# Patient Record
Sex: FEMALE | Race: BLACK OR AFRICAN AMERICAN
[De-identification: names, ages, dates, MRNs, and addresses within clinical notes are randomized per-mention and may not be internally consistent; named-entity substitution may affect disease eponyms.]

---

## 2017-03-01 ENCOUNTER — RX ONLY (OUTPATIENT)
Age: 47
Setting detail: RX ONLY
End: 2017-03-01

## 2017-04-01 ENCOUNTER — HOSPITAL ENCOUNTER (EMERGENCY)
Dept: HOSPITAL 5 - ED | Age: 47
LOS: 1 days | Discharge: HOME | End: 2017-04-02
Payer: COMMERCIAL

## 2017-04-01 DIAGNOSIS — R10.31: ICD-10-CM

## 2017-04-01 DIAGNOSIS — N83.8: Primary | ICD-10-CM

## 2017-04-01 LAB
ANION GAP SERPL CALC-SCNC: 16 MMOL/L
BASOPHILS NFR BLD AUTO: 0.6 % (ref 0–1.8)
BILIRUB UR QL STRIP: (no result)
BLOOD UR QL VISUAL: (no result)
BUN SERPL-MCNC: 15 MG/DL (ref 7–17)
BUN/CREAT SERPL: 25 %
CALCIUM SERPL-MCNC: 9.4 MG/DL (ref 8.4–10.2)
CHLORIDE SERPL-SCNC: 98.5 MMOL/L (ref 98–107)
CO2 SERPL-SCNC: 25 MMOL/L (ref 22–30)
EOSINOPHIL NFR BLD AUTO: 2.6 % (ref 0–4.3)
GLUCOSE SERPL-MCNC: 109 MG/DL (ref 65–100)
HCT VFR BLD CALC: 38 % (ref 30.3–42.9)
HGB BLD-MCNC: 12.1 GM/DL (ref 10.1–14.3)
KETONES UR STRIP-MCNC: (no result) MG/DL
LEUKOCYTE ESTERASE UR QL STRIP: (no result)
MCH RBC QN AUTO: 28 PG (ref 28–32)
MCHC RBC AUTO-ENTMCNC: 32 % (ref 30–34)
MCV RBC AUTO: 89 FL (ref 79–97)
NITRITE UR QL STRIP: (no result)
PH UR STRIP: 5 [PH] (ref 5–7)
PLATELET # BLD: 430 K/MM3 (ref 140–440)
POTASSIUM SERPL-SCNC: 3.7 MMOL/L (ref 3.6–5)
PROT UR STRIP-MCNC: (no result) MG/DL
RBC # BLD AUTO: 4.29 M/MM3 (ref 3.65–5.03)
RBC #/AREA URNS HPF: 1 /HPF (ref 0–6)
SODIUM SERPL-SCNC: 136 MMOL/L (ref 137–145)
UROBILINOGEN UR-MCNC: < 2 MG/DL (ref ?–2)
WBC # BLD AUTO: 8.3 K/MM3 (ref 4.5–11)
WBC #/AREA URNS HPF: < 1 /HPF (ref 0–6)

## 2017-04-01 PROCEDURE — 81001 URINALYSIS AUTO W/SCOPE: CPT

## 2017-04-01 PROCEDURE — 36415 COLL VENOUS BLD VENIPUNCTURE: CPT

## 2017-04-01 PROCEDURE — 96374 THER/PROPH/DIAG INJ IV PUSH: CPT

## 2017-04-01 PROCEDURE — 74177 CT ABD & PELVIS W/CONTRAST: CPT

## 2017-04-01 PROCEDURE — 76856 US EXAM PELVIC COMPLETE: CPT

## 2017-04-01 PROCEDURE — 76830 TRANSVAGINAL US NON-OB: CPT

## 2017-04-01 PROCEDURE — 80048 BASIC METABOLIC PNL TOTAL CA: CPT

## 2017-04-01 PROCEDURE — 81025 URINE PREGNANCY TEST: CPT

## 2017-04-01 PROCEDURE — 99284 EMERGENCY DEPT VISIT MOD MDM: CPT

## 2017-04-01 PROCEDURE — 85025 COMPLETE CBC W/AUTO DIFF WBC: CPT

## 2017-04-01 NOTE — ULTRASOUND REPORT
FINAL REPORT



EXAM:  US PELVIC COMPLETE



HISTORY:  abdominal pain 



TECHNIQUE:  Transvesical and endovaginal pelvic sonographic

imaging



FINDINGS:  

The uterus is normally anteverted. It measures 8.8 x 4.0 x 5.8

centimeters.



Endometrial stripe measures 2.6 millimeters which is within

normal limits.



Right ovary is not identified. By clinical history it is

surgically absent.



Left ovary measures 4.3 x 4.2 x 3.6 centimeters with 2 cysts, 1

measuring 3 centimeters and 1 measuring 1.6 centimeters. The

cysts appear simple.



There is no free pelvic fluid or adnexal mass lesion.



IMPRESSION:  

Surgically absent right ovary.



Two left ovarian/adnexal cysts, measuring 3 centimeters and 1.6

centimeters. These cysts appear simple. If clinically indicated,

recommend off cycle follow-up imaging in 6 or 10 weeks.



No adnexal mass or free cul-de-sac fluid.



Normal endometrial stripe thickness.

## 2017-04-02 VITALS — SYSTOLIC BLOOD PRESSURE: 139 MMHG | DIASTOLIC BLOOD PRESSURE: 98 MMHG

## 2017-04-02 NOTE — EMERGENCY DEPARTMENT REPORT
HPI





- General


Chief Complaint: Abdominal Pain


Time Seen by Provider: 04/02/17 07:25





- HPI


HPI: 





Room 10





The patient is a 46-year-old female presenting with a chief complaint of 

abdominal pain.  The patient states for 1 week she has had constant pain in the 

right lower quadrant  all occasional sharp component.  Patient states 

her pain is constant in nature.  The patient states 2 days ago shortness or 

primary physician for this pain where she underwent a pelvic exam/Pap smear, 

had blood work drawn and an ultrasound performed.  The patient states she was 

told she had an ovarian cyst on the right and there was concern for pelvic 

inflammatory disease.  Patient was started on doxycycline and Percocet.  The 

patient states she has been taking the antibiotic as prescribed but only taken 

a small amount of the Percocet because she is afraid of becoming addicted.  The 

patient states her pain has increased since yesterday prompting her to come to 

the emergency department.  Patient admits to occasional nausea but denies 

vomiting, diarrhea or fever.  Patient currently gives her pain a score of 8/10





Location: Right lower quadrant


Duration: One week


Quality: Dull and sharp


Severity:8/10


Modifying factors: [see above]


Context: [see above]


Mode of transportation: The patient drove herself to the emergency department 

and there are no visitors present





ED Past Medical Hx





- Past Medical History


Previous Medical History?: No





- Surgical History


Hx Appendectomy: Yes





- Family History


Family history: no significant





- Social History


Smoking Status: Never Smoker


Substance Use Type: None (denies illicit drug use), Alcohol (occasional)





- Medications


Home Medications: 


 Home Medications











 Medication  Instructions  Recorded  Confirmed  Last Taken  Type


 


Doxycycline 100 mg PO BID 04/01/17 04/01/17 Unknown History


 


Hydrochlorothiazide [HCTZ] 1 tab PO DAILY 04/01/17 04/01/17 Unknown History


 


Metoprolol 50 mg PO DAILY 04/01/17 04/01/17 Unknown History


 


Oxycodone HCl/Acetaminophen 1 tab PO Q8H 04/01/17 04/01/17 Unknown History





[Percocet 7.5/325 mg]     














ED Review of Systems


ROS: 


Stated complaint: ABD PAIN/EMESIS


Other details as noted in HPI





Comment: All other systems reviewed and negative


Constitutional: denies: chills, fever


Eyes: denies: eye pain, eye discharge, vision change


ENT: denies: ear pain, throat pain


Respiratory: denies: cough, shortness of breath, wheezing


Cardiovascular: denies: chest pain, palpitations


Endocrine: no symptoms reported


Gastrointestinal: nausea.  denies: vomiting


Genitourinary: denies: urgency, dysuria, discharge


Musculoskeletal: denies: back pain, joint swelling, arthralgia


Skin: denies: rash, lesions


Neurological: denies: headache, weakness, paresthesias


Psychiatric: denies: anxiety, depression


Hematological/Lymphatic: denies: easy bleeding, easy bruising





Physical Exam





- Physical Exam


Vital Signs: 


 Vital Signs











  04/01/17 04/02/17 04/02/17





  20:30 07:14 07:15


 


Temperature 97.8 F 98 F 


 


Pulse Rate 85 66 


 


Respiratory 18 18 18





Rate   


 


Blood Pressure 171/115  


 


Blood Pressure 171/115 139/93 





[Left]   


 


O2 Sat by Pulse 97 100 100





Oximetry   











Physical Exam: 





GENERAL: The patient is well-developed well-nourished female lying on stretcher 

not appearing to be in acute distress. []


HEENT: Normocephalic.  Atraumatic.  Extraocular motions are intact.  Patient 

has moist mucous membranes.


NECK: Supple.  Trachea midline


CHEST/LUNGS: Clear to auscultation.  There is no respiratory distress noted.


HEART/CARDIOVASCULAR: Regular.  There is no tachycardia.  There is no gallop 

rub or murmur.


ABDOMEN: Abdomen is soft, with mild discomfort to palpation in the right lower 

quadrant.  Patient has normal bowel sounds.  There is no abdominal distention.


SKIN: There is no rash.  There is no edema.  There is no diaphoresis.


NEURO: The patient is awake, alert, and oriented.  The patient is cooperative.  

The patient has normal speech 


MUSCULOSKELETAL:  There is no evidence of acute injury.





ED Course


 Vital Signs











  04/01/17 04/02/17 04/02/17





  20:30 07:14 07:15


 


Temperature 97.8 F 98 F 


 


Pulse Rate 85 66 


 


Respiratory 18 18 18





Rate   


 


Blood Pressure 171/115  


 


Blood Pressure 171/115 139/93 





[Left]   


 


O2 Sat by Pulse 97 100 100





Oximetry   














ED Medical Decision Making





- Lab Data


Result diagrams: 


 04/01/17 20:42





 04/01/17 20:42





 Laboratory Tests











  04/01/17 04/01/17 04/01/17





  20:42 20:42 21:13


 


WBC  8.3  


 


RBC  4.29  


 


Hgb  12.1  


 


Hct  38.0  


 


MCV  89  


 


MCH  28  


 


MCHC  32  


 


RDW  14.0  


 


Plt Count  430  


 


Lymph % (Auto)  28.1  


 


Mono % (Auto)  7.8 H  


 


Eos % (Auto)  2.6  


 


Baso % (Auto)  0.6  


 


Lymph #  2.3  


 


Mono #  0.6  


 


Eos #  0.2  


 


Baso #  0.1  


 


Seg Neutrophils %  60.9  


 


Seg Neutrophils #  5.0  


 


Sodium   136 L 


 


Potassium   3.7 


 


Chloride   98.5 


 


Carbon Dioxide   25 


 


Anion Gap   16 


 


BUN   15 


 


Creatinine   0.6 L 


 


Estimated GFR   > 60 


 


BUN/Creatinine Ratio   25.00 


 


Glucose   109 H 


 


Calcium   9.4 


 


Urine Color    Straw


 


Urine Turbidity    Clear


 


Urine pH    5.0


 


Ur Specific Gravity    1.008


 


Urine Protein    <15 mg/dl


 


Urine Glucose (UA)    Neg


 


Urine Ketones    Neg


 


Urine Blood    Neg


 


Urine Nitrite    Neg


 


Ur Reducing Substances    Not Reportable


 


Urine Bilirubin    Neg


 


Urine Ictotest    Not Reportable


 


Urine Urobilinogen    < 2.0


 


Ur Leukocyte Esterase    Neg


 


Urine WBC (Auto)    < 1.0


 


Urine RBC (Auto)    1.0


 


U Epithel Cells (Auto)    2.0


 


Urine HCG, Qual    Negative

















- Radiology Data


Radiology results: report reviewed (CT abdomen and pelvis), image reviewed (CT 

abdomen and pelvis)


CT abdomen and pelvis (read by radiologist)-there is a nonspecific left adnexal 

cystic mass measuring about 2.8 x 2.8 x 3.4 cm.  There is some hyperdensity or 

enhancement in the endometrial cavity.  I cannot exclude an endometrial mass or 

polyp.  Small umbilical hernia which contains fat only.








- Differential Diagnosis


ovarian cysts, diverticulitis, cholelithiasis


Critical care attestation.: 


If time is entered above; I have spent that time in minutes in the direct care 

of this critically ill patient, excluding procedure time.








ED Disposition


Clinical Impression: 


 Abdominal pain, Adnexal cyst





Disposition: DISCHARGED TO HOME OR SELFCARE


Is pt being admited?: No


Does the pt Need Aspirin: No


Condition: Stable


Instructions:  Abdominal Pain (ED)


Additional Instructions: 


Return to the emergency department immediately should you develop worsening 

symptoms, fever, inability to tolerate food or liquid or any other concerns.


Referrals: 


[facility] [Primary Care Provider] - 3-5 Days


[facility] [Provider Group] - 3-5 Days


Time of Disposition: 09:17

## 2017-04-02 NOTE — CAT SCAN REPORT
FINAL REPORT



EXAM:  CT ABDOMEN PELVIS W CON



HISTORY:  right lower quadrant abdominal pain. 



TECHNIQUE:  CT abdomen and pelvis performed.  Images extend from

diaphragm to pubic symphysis.  



PRIORS:  None.



FINDINGS:  

The visualized aspects of the lung bases are clear.

The visualized liver, spleen, pancreas, adrenal glands and

kidneys demonstrate no significant abnormalities.

There is no abdominal aortic aneurysm.

There is no evidence of intestinal obstruction.

The appendix is normal.

There is no free intraperitoneal air.

There is a small umbilical hernia which contains only fat.



The bladder is unremarkable.

There is a left adnexal cystic mass 3.8 x  2.8 x 3.4 cm. There is

some hyperdense or enhancing material within the endometrial

cavity. I cannot exclude an endometrial mass or polyp. 



IMPRESSION:  

There is a nonspecific left adnexal cystic mass measuring about

3.8 x 2.8 x 3.4 cm.



There is some hyperdensity or enhancement in the endometrial

cavity. I cannot exclude an endometrial mass or polyp.



Small umbilical hernia which contains only fat.

## 2023-07-05 ENCOUNTER — P2P PATIENT RECORD (OUTPATIENT)
Age: 53
End: 2023-07-05

## 2023-07-26 ENCOUNTER — OFFICE VISIT (OUTPATIENT)
Dept: URBAN - METROPOLITAN AREA SURGERY CENTER 23 | Facility: SURGERY CENTER | Age: 53
End: 2023-07-26

## 2023-09-14 ENCOUNTER — OFFICE VISIT (OUTPATIENT)
Dept: URBAN - METROPOLITAN AREA CLINIC 118 | Facility: CLINIC | Age: 53
End: 2023-09-14

## 2023-11-08 ENCOUNTER — OFFICE VISIT (OUTPATIENT)
Dept: URBAN - METROPOLITAN AREA CLINIC 118 | Facility: CLINIC | Age: 53
End: 2023-11-08

## 2023-11-15 ENCOUNTER — OFFICE VISIT (OUTPATIENT)
Dept: URBAN - METROPOLITAN AREA CLINIC 118 | Facility: CLINIC | Age: 53
End: 2023-11-15

## 2023-11-15 RX ORDER — ACETAMINOPHEN AND CODEINE PHOSPHATE 300; 30 MG/1; MG/1
TABLET ORAL
Qty: 0 | Refills: 0 | Status: ACTIVE | COMMUNITY
Start: 2017-06-19 | End: 1900-01-01

## 2023-11-15 RX ORDER — HYDROCHLOROTHIAZIDE 50 MG/1
TABLET ORAL
Qty: 0 | Refills: 0 | Status: ACTIVE | COMMUNITY
Start: 2017-06-18 | End: 1900-01-01

## 2023-11-15 RX ORDER — AMOXICILLIN 250 MG/1
CAPSULE ORAL
Qty: 0 | Refills: 0 | Status: ACTIVE | COMMUNITY
Start: 2017-07-06 | End: 1900-01-01

## 2023-11-15 RX ORDER — DICYCLOMINE HYDROCHLORIDE 20 MG/1
TABLET ORAL
Qty: 0 | Refills: 0 | Status: ACTIVE | COMMUNITY
Start: 2017-05-24 | End: 1900-01-01

## 2023-11-15 RX ORDER — METOPROLOL TARTRATE 50 MG/1
TABLET, FILM COATED ORAL
Qty: 0 | Refills: 0 | Status: ACTIVE | COMMUNITY
Start: 2017-06-25 | End: 1900-01-01

## 2023-11-15 RX ORDER — UBIDECARENONE 30 MG
CAPSULE ORAL
Qty: 0 | Refills: 0 | Status: ACTIVE | COMMUNITY
Start: 1900-01-01 | End: 1900-01-01

## 2023-11-15 RX ORDER — ALPRAZOLAM 0.5 MG/1
TABLET ORAL
Qty: 0 | Refills: 0 | Status: ACTIVE | COMMUNITY
Start: 2017-07-06 | End: 1900-01-01

## 2024-09-10 ENCOUNTER — OFFICE VISIT (OUTPATIENT)
Dept: URBAN - METROPOLITAN AREA CLINIC 88 | Facility: CLINIC | Age: 54
End: 2024-09-10

## 2024-09-10 RX ORDER — ACETAMINOPHEN AND CODEINE PHOSPHATE 300; 30 MG/1; MG/1
TABLET ORAL
Qty: 0 | Refills: 0 | Status: ACTIVE | COMMUNITY
Start: 2017-06-19

## 2024-09-10 RX ORDER — UBIDECARENONE 30 MG
CAPSULE ORAL
Qty: 0 | Refills: 0 | Status: ACTIVE | COMMUNITY
Start: 1900-01-01

## 2024-09-10 RX ORDER — ALPRAZOLAM 0.5 MG/1
TABLET ORAL
Qty: 0 | Refills: 0 | Status: ACTIVE | COMMUNITY
Start: 2017-07-06

## 2024-09-10 RX ORDER — HYDROCHLOROTHIAZIDE 50 MG/1
TABLET ORAL
Qty: 0 | Refills: 0 | Status: ACTIVE | COMMUNITY
Start: 2017-06-18

## 2024-09-10 RX ORDER — AMOXICILLIN 250 MG/1
CAPSULE ORAL
Qty: 0 | Refills: 0 | Status: ACTIVE | COMMUNITY
Start: 2017-07-06

## 2024-09-10 RX ORDER — METOPROLOL TARTRATE 50 MG/1
TABLET, FILM COATED ORAL
Qty: 0 | Refills: 0 | Status: ACTIVE | COMMUNITY
Start: 2017-06-25

## 2024-09-10 RX ORDER — DICYCLOMINE HYDROCHLORIDE 20 MG/1
TABLET ORAL
Qty: 0 | Refills: 0 | Status: ACTIVE | COMMUNITY
Start: 2017-05-24

## 2024-09-10 NOTE — HPI-TODAY'S VISIT:
54 referred by Saurav Howard NP to schedule screening colonoscopy. Her last colonoscopy was in 2017 by Dr. Joey Freeman with normal colon and TI.  Repeat colonoscopy was recommended in 10 years.  Denies family history of colon cancer.  Patient has not had signs of rectal bleeding, changes in bowel habits, constipation, or diarrhea.

## 2024-09-17 ENCOUNTER — DASHBOARD ENCOUNTERS (OUTPATIENT)
Age: 54
End: 2024-09-17

## 2024-09-24 ENCOUNTER — OFFICE VISIT (OUTPATIENT)
Dept: URBAN - METROPOLITAN AREA CLINIC 88 | Facility: CLINIC | Age: 54
End: 2024-09-24

## 2024-09-24 RX ORDER — AMOXICILLIN 250 MG/1
CAPSULE ORAL
Qty: 0 | Refills: 0 | Status: ACTIVE | COMMUNITY
Start: 2017-07-06

## 2024-09-24 RX ORDER — DICYCLOMINE HYDROCHLORIDE 20 MG/1
TABLET ORAL
Qty: 0 | Refills: 0 | Status: ACTIVE | COMMUNITY
Start: 2017-05-24

## 2024-09-24 RX ORDER — ACETAMINOPHEN AND CODEINE PHOSPHATE 300; 30 MG/1; MG/1
TABLET ORAL
Qty: 0 | Refills: 0 | Status: ACTIVE | COMMUNITY
Start: 2017-06-19

## 2024-09-24 RX ORDER — HYDROCHLOROTHIAZIDE 50 MG/1
TABLET ORAL
Qty: 0 | Refills: 0 | Status: ACTIVE | COMMUNITY
Start: 2017-06-18

## 2024-09-24 RX ORDER — UBIDECARENONE 30 MG
CAPSULE ORAL
Qty: 0 | Refills: 0 | Status: ACTIVE | COMMUNITY
Start: 1900-01-01

## 2024-09-24 RX ORDER — METOPROLOL TARTRATE 50 MG/1
TABLET, FILM COATED ORAL
Qty: 0 | Refills: 0 | Status: ACTIVE | COMMUNITY
Start: 2017-06-25

## 2024-09-24 RX ORDER — ALPRAZOLAM 0.5 MG/1
TABLET ORAL
Qty: 0 | Refills: 0 | Status: ACTIVE | COMMUNITY
Start: 2017-07-06

## 2024-10-04 ENCOUNTER — OFFICE VISIT (OUTPATIENT)
Dept: URBAN - METROPOLITAN AREA CLINIC 88 | Facility: CLINIC | Age: 54
End: 2024-10-04

## 2024-10-15 ENCOUNTER — OFFICE VISIT (OUTPATIENT)
Dept: URBAN - METROPOLITAN AREA CLINIC 88 | Facility: CLINIC | Age: 54
End: 2024-10-15

## 2024-10-22 ENCOUNTER — OFFICE VISIT (OUTPATIENT)
Dept: URBAN - METROPOLITAN AREA CLINIC 88 | Facility: CLINIC | Age: 54
End: 2024-10-22

## 2024-10-30 ENCOUNTER — OFFICE VISIT (OUTPATIENT)
Dept: URBAN - METROPOLITAN AREA CLINIC 88 | Facility: CLINIC | Age: 54
End: 2024-10-30

## 2024-10-30 RX ORDER — ALPRAZOLAM 0.5 MG/1
TABLET ORAL
Qty: 0 | Refills: 0 | Status: ACTIVE | COMMUNITY
Start: 2017-07-06

## 2024-10-30 RX ORDER — ACETAMINOPHEN AND CODEINE PHOSPHATE 300; 30 MG/1; MG/1
TABLET ORAL
Qty: 0 | Refills: 0 | Status: ACTIVE | COMMUNITY
Start: 2017-06-19

## 2024-10-30 RX ORDER — METOPROLOL TARTRATE 50 MG/1
TABLET, FILM COATED ORAL
Qty: 0 | Refills: 0 | Status: ACTIVE | COMMUNITY
Start: 2017-06-25

## 2024-10-30 RX ORDER — AMOXICILLIN 250 MG/1
CAPSULE ORAL
Qty: 0 | Refills: 0 | Status: ACTIVE | COMMUNITY
Start: 2017-07-06

## 2024-10-30 RX ORDER — HYDROCHLOROTHIAZIDE 50 MG/1
TABLET ORAL
Qty: 0 | Refills: 0 | Status: ACTIVE | COMMUNITY
Start: 2017-06-18

## 2024-10-30 RX ORDER — DICYCLOMINE HYDROCHLORIDE 20 MG/1
TABLET ORAL
Qty: 0 | Refills: 0 | Status: ACTIVE | COMMUNITY
Start: 2017-05-24

## 2024-10-30 RX ORDER — UBIDECARENONE 30 MG
CAPSULE ORAL
Qty: 0 | Refills: 0 | Status: ACTIVE | COMMUNITY
Start: 1900-01-01